# Patient Record
Sex: MALE | Race: WHITE
[De-identification: names, ages, dates, MRNs, and addresses within clinical notes are randomized per-mention and may not be internally consistent; named-entity substitution may affect disease eponyms.]

---

## 2019-04-18 ENCOUNTER — HOSPITAL ENCOUNTER (EMERGENCY)
Dept: HOSPITAL 75 - ER FS | Age: 34
Discharge: HOME | End: 2019-04-18
Payer: COMMERCIAL

## 2019-04-18 VITALS — HEIGHT: 72 IN | BODY MASS INDEX: 38.87 KG/M2 | WEIGHT: 287 LBS

## 2019-04-18 VITALS — DIASTOLIC BLOOD PRESSURE: 118 MMHG | SYSTOLIC BLOOD PRESSURE: 157 MMHG

## 2019-04-18 DIAGNOSIS — Z88.8: ICD-10-CM

## 2019-04-18 DIAGNOSIS — Z88.0: ICD-10-CM

## 2019-04-18 DIAGNOSIS — E11.649: Primary | ICD-10-CM

## 2019-04-18 LAB
APTT PPP: YELLOW S
BACTERIA #/AREA URNS HPF: (no result) /HPF
BILIRUB UR QL STRIP: NEGATIVE
FIBRINOGEN PPP-MCNC: CLEAR MG/DL
GLUCOSE UR STRIP-MCNC: (no result) MG/DL
KETONES UR QL STRIP: NEGATIVE
LEUKOCYTE ESTERASE UR QL STRIP: NEGATIVE
NITRITE UR QL STRIP: NEGATIVE
PH UR STRIP: 5.5 [PH] (ref 5–9)
PROT UR QL STRIP: NEGATIVE
RBC #/AREA URNS HPF: (no result) /HPF
SP GR UR STRIP: <1.005 (ref 1.02–1.02)
SQUAMOUS #/AREA URNS HPF: (no result) /HPF
UROBILINOGEN UR-MCNC: 0.2 MG/DL
WBC #/AREA URNS HPF: (no result) /HPF

## 2019-04-18 PROCEDURE — 81000 URINALYSIS NONAUTO W/SCOPE: CPT

## 2019-04-18 PROCEDURE — 82962 GLUCOSE BLOOD TEST: CPT

## 2019-04-18 NOTE — ED GENERAL
General


Chief Complaint:  sent for abnormal work labs


Stated Complaint:  ABNORMAL LAB RESULTS


Source of Information:  Patient


Exam Limitations:  No Limitations





History of Present Illness


Date Seen by Provider:  Apr 18, 2019


Time Seen by Provider:  15:15


HR was called by lab here with employment labs for abnormal labs, he was told 

to come here for further eval.  Reports his glucose was high.  He has checked 

his glucose when at his previous job a few years ago and it was high then, but 

did not have insurance at the time and did not follow up with anyone.  He has 

had some polydipsia, tingling in feet greater than the hands.  No polyurea or 

polyphagia.





Allergies and Home Medications


Allergies


Coded Allergies:  


     Penicillins (Verified  Allergy, Unknown, 4/18/19)


     lisinopril (Verified  Allergy, Unknown, rash, 4/18/19)





Patient Home Medication List


Home Medication List Reviewed:  Yes





Review of Systems


Review of Systems


Constitutional:  No chills, No fever, No weakness


EENTM:  No blurred vision, No double vision


Respiratory:  No cough, No short of breath


Cardiovascular:  No chest pain, No edema


Gastrointestinal:  No abdominal pain, No nausea, No vomiting


Genitourinary:  No dysuria, No hematuria, No nocturia


Musculoskeletal:  No joint pain, No muscle pain


Skin:  No lesions, No rash


Psychiatric/Neurological:  Tingling





Past Medical-Social-Family Hx


Past Med/Social Hx:  Reviewed Nursing Past Med/Soc Hx





Physical Exam


Vital Signs





Vital Signs - First Documented








 4/18/19





 15:07


 


Temp 98.6


 


Pulse 113


 


Resp 18


 


B/P (MAP) 144/109 (121)


 


Pulse Ox 95





Capillary Refill :


Height, Weight, BMI


Height: '"


Weight: lbs. oz. kg;  BMI


Method:


General Appearance:  No Apparent Distress, WD/WN


HEENT:  PERRL/EOMI, Normal ENT Inspection


Neck:  Full Range of Motion, Normal Inspection


Respiratory:  Chest Non Tender, Lungs Clear, Normal Breath Sounds, No Accessory 

Muscle Use, No Respiratory Distress


Cardiovascular:  Regular Rate, Rhythm, No Edema, No Gallop, No JVD, No Murmur, 

Normal Peripheral Pulses


Gastrointestinal:  Normal Bowel Sounds, No Organomegaly, No Pulsatile Mass, Non 

Tender, Soft


Extremity:  Normal Capillary Refill, Normal Inspection


Neurologic/Psychiatric:  Alert, Oriented x3, No Motor/Sensory Deficits, Normal 

Mood/Affect


Skin:  Normal Color, Warm/Dry





Progress/Results/Core Measures


Suspected Sepsis


SIRS


Temperature: 


Pulse:  


Respiratory Rate: 


 


Blood Pressure  / 


Mean:





Results/Orders


Lab Results





Laboratory Tests








Test


 4/18/19


15:30 4/18/19


15:40 Range/Units


 


 


Urine Color YELLOW    


 


Urine Clarity CLEAR    


 


Urine pH 5.5   5-9  


 


Urine Specific Gravity <1.005   1.016-1.022  


 


Urine Protein NEGATIVE   NEGATIVE  


 


Urine Glucose (UA) 3+ H  NEGATIVE  


 


Urine Ketones NEGATIVE   NEGATIVE  


 


Urine Nitrite NEGATIVE   NEGATIVE  


 


Urine Bilirubin NEGATIVE   NEGATIVE  


 


Urine Urobilinogen 0.2   NORMAL  MG/DL


 


Urine Leukocyte Esterase NEGATIVE   NEGATIVE  


 


Urine RBC (Auto) NEGATIVE   NEGATIVE  


 


Urine RBC NONE    /HPF


 


Urine WBC NONE    /HPF


 


Urine Squamous Epithelial


Cells RARE 


 


  /HPF





 


Urine Crystals NONE    /LPF


 


Urine Bacteria NONE    /HPF


 


Urine Casts NONE    /LPF


 


Urine Mucus NEGATIVE    /LPF


 


Urine Culture Indicated NO    


 


Glucometer  499 *H   MG/DL








My Orders





Orders - DIANE CROWE MD


Accucheck Stat ONCE (4/18/19 15:27)


Urinalysis (4/18/19 15:27)





Vital Signs/I&O











 4/18/19





 15:07


 


Temp 98.6


 


Pulse 113


 


Resp 18


 


B/P (MAP) 144/109 (121)


 


Pulse Ox 95





Capillary Refill :


Progress Note :  


Progress Note


Labs from his HR reviewed:


TSH 2.15


CMP Na 134, K 4.4, Cl 95, CO2 27, BUN 8, Crt 0.6, Gluc 423, Ca 9.6, T bili 0.5, 

Alk Phos 79, AST 19, ALT 32


T Chol 234, HDL 34, LDL unable to calculate, Trig 479


CBC pending





Departure


Impression





 Primary Impression:  


 Hypoglycemia associated with diabetes


Disposition:  01 HOME, SELF-CARE


Condition:  Stable





Departure-Patient Inst.


Decision time for Depature:  16:13


Referrals:  


NO,LOCAL PHYSICIAN (PCP)


Primary Care Physician








[facility]


Patient Instructions:  Diabetes Type 2 (DC)











DIANE CROWE MD Apr 18, 2019 15:15

## 2019-04-18 NOTE — NUR
Yudith from urgent care just called about patient. She stated sorry it took 
so long for her to call, they had a ambulance issue. She stated that the 
patient did not know about why he was getting sent over to the emergency room. 
She stated that this morning his fasting blood sugar was 423.